# Patient Record
Sex: FEMALE | Race: WHITE
[De-identification: names, ages, dates, MRNs, and addresses within clinical notes are randomized per-mention and may not be internally consistent; named-entity substitution may affect disease eponyms.]

---

## 2018-01-28 ENCOUNTER — HOSPITAL ENCOUNTER (EMERGENCY)
Dept: HOSPITAL 17 - PHED | Age: 48
Discharge: HOME | End: 2018-01-28
Payer: COMMERCIAL

## 2018-01-28 VITALS
SYSTOLIC BLOOD PRESSURE: 169 MMHG | HEART RATE: 92 BPM | OXYGEN SATURATION: 100 % | RESPIRATION RATE: 18 BRPM | DIASTOLIC BLOOD PRESSURE: 79 MMHG | TEMPERATURE: 98.2 F

## 2018-01-28 VITALS
SYSTOLIC BLOOD PRESSURE: 129 MMHG | OXYGEN SATURATION: 99 % | HEART RATE: 91 BPM | DIASTOLIC BLOOD PRESSURE: 80 MMHG | RESPIRATION RATE: 20 BRPM

## 2018-01-28 VITALS — DIASTOLIC BLOOD PRESSURE: 80 MMHG | SYSTOLIC BLOOD PRESSURE: 129 MMHG

## 2018-01-28 VITALS — HEIGHT: 65 IN | BODY MASS INDEX: 20.2 KG/M2 | WEIGHT: 121.25 LBS

## 2018-01-28 DIAGNOSIS — R53.81: ICD-10-CM

## 2018-01-28 DIAGNOSIS — J02.9: Primary | ICD-10-CM

## 2018-01-28 DIAGNOSIS — R50.9: ICD-10-CM

## 2018-01-28 DIAGNOSIS — R05: ICD-10-CM

## 2018-01-28 PROCEDURE — 99283 EMERGENCY DEPT VISIT LOW MDM: CPT

## 2018-01-28 PROCEDURE — 87804 INFLUENZA ASSAY W/OPTIC: CPT

## 2018-01-28 NOTE — PD
HPI


Chief Complaint:  Cold / Flu Symptoms


Time Seen by Provider:  10:46


Travel History


International Travel<30 days:  No


Contact w/Intl Traveler<30days:  No


Traveled to known affect area:  No





History of Present Illness


HPI


Patient presents with complaints of sore throat, nonproductive cough general 

malaise and fever since Thursday.  She did not get a flu shot.  Denies nausea 

vomiting or diarrhea.  No new rashes.  No tobacco exposure.  No history of lung 

disease.  Unknown sick contacts.





PFSH


Past Medical History


Cancer:  Yes (CERVICAL)


Cardiovascular Problems:  No


Diminished Hearing:  No


Endocrine:  No


Genitourinary:  No


Hepatitis:  No


Hiatal Hernia:  No


Hypertension:  No


Immune Disorder:  No


Musculoskeletal:  No


Neurologic:  No


Psychiatric:  No


Reproductive:  No


Respiratory:  No


Pregnant?:  Not Pregnant


:  3


Para:  2





Past Surgical History


Abdominal Surgery:  No


Body Medical Devices:  NONE


Cardiac Surgery:  No


Ear Surgery:  No


Endocrine Surgery:  No


Eye Surgery:  No


Genitourinary Surgery:  No


Gynecologic Surgery:  Yes (2 CERVICAL BIOPSIES)


Hysterectomy:  Yes


Oral Surgery:  No


Thoracic Surgery:  No


Other Surgery:  Yes





Social History


Alcohol Use:  Yes (social)


Tobacco Use:  No


Substance Use:  No





Allergies-Medications


(Allergen,Severity, Reaction):  


Coded Allergies:  


     No Known Allergies (Unverified  Adverse Reaction, Unknown, 18)


Reported Meds & Prescriptions





Reported Meds & Active Scripts


Active


No Active Prescriptions or Reported Medications    








Review of Systems


General / Constitutional:  Positive: Fever


Eyes:  No: Visual changes


HENT:  No: Headaches


Cardiovascular:  No: Chest Pain or Discomfort


Respiratory:  Positive: Cough, No: Shortness of Breath


Gastrointestinal:  No: Abdominal Pain


Genitourinary:  No: Dysuria


Musculoskeletal:  No: Pain


Skin:  No Rash


Neurologic:  No: Weakness


Psychiatric:  No: Depression


Endocrine:  No: Polydipsia


Hematologic/Lymphatic:  No: Easy Bruising





Physical Exam


Narrative


GENERAL: Well-nourished, well-developed patient.


SKIN: Focused skin assessment warm/dry.


HEAD: Normocephalic.


EYES: No scleral icterus. No injection or drainage. 


NECK: Supple, trachea midline. No JVD or lymphadenopathy


Throat is erythematous no adenopathy no exudate.


CARDIOVASCULAR: Regular rate and rhythm without murmurs, gallops, or rubs. 


RESPIRATORY: Breath sounds equal bilaterally. No accessory muscle use.


GASTROINTESTINAL: Abdomen soft, non-tender, nondistended. 


MUSCULOSKELETAL: No cyanosis, or edema. 


BACK: Nontender without obvious deformity. No CVA tenderness.





Data


Data


Last Documented VS





Vital Signs








  Date Time  Temp Pulse Resp B/P (MAP) Pulse Ox O2 Delivery O2 Flow Rate FiO2


 


18 11:07  91 20 129/80 (96) 99   


 


18 09:45 98.2       








Orders





 Orders


Influenzae A/B Antigen (18 10:46)








MDM


Medical Decision Making


Medical Screen Exam Complete:  Yes


Emergency Medical Condition:  Yes


Differential Diagnosis


Pharyngitis, influenza, viral syndrome, pneumonia


Narrative Course


Assessment and plan discussed with wife and  at bedside.





Diagnosis





 Primary Impression:  


 Acute pharyngitis


 Qualified Codes:  J02.9 - Acute pharyngitis, unspecified


Patient Instructions:  General Instructions





***Additional Instructions:  


Rest fluids Motrin/Tylenol.  Encouraged frequent handwashing, consider vitamin 

C and zinc to boost immune system Return extremity onset new symptoms.  Follow-

up with PCP


***Med/Other Pt SpecificInfo:  Prescription(s) given


Scripts


Guaifenesin-Codeine Liq (Cheratussin AC Liq) 100-10 Mg/5 Ml Syrp


5-10 ML PO Q4H Y for COUGH AND COLD SYMPTOMS, #120 ML 0 Refills


   Do not exceed 6 doses/24 hrs.


   Prov: Martín Jolly MD         18 


Azithromycin (Zithromax Z-Ilir) 250 Mg Dspk


250 MG PO AS DIRECTED for Infection, #1 DSPK 0 Refills


   500 MG (2 tabs) day 1, then 1 tab days 2-5.


   Prov: Martín Jolly MD         18


Disposition:  01 DISCHARGE HOME


Condition:  Good











Martín Jolly MD 2018 10:49